# Patient Record
Sex: MALE | Race: OTHER | NOT HISPANIC OR LATINO | Employment: FULL TIME | URBAN - METROPOLITAN AREA
[De-identification: names, ages, dates, MRNs, and addresses within clinical notes are randomized per-mention and may not be internally consistent; named-entity substitution may affect disease eponyms.]

---

## 2023-07-11 ENCOUNTER — HOSPITAL ENCOUNTER (EMERGENCY)
Facility: HOSPITAL | Age: 38
Discharge: HOME | End: 2023-07-12
Attending: FAMILY MEDICINE

## 2023-07-11 DIAGNOSIS — K91.840 HEMORRHAGE OF TOOTH SOCKET: Primary | ICD-10-CM

## 2023-07-12 VITALS
HEART RATE: 79 BPM | HEIGHT: 63 IN | WEIGHT: 169.75 LBS | RESPIRATION RATE: 20 BRPM | DIASTOLIC BLOOD PRESSURE: 88 MMHG | TEMPERATURE: 98 F | OXYGEN SATURATION: 100 % | SYSTOLIC BLOOD PRESSURE: 143 MMHG | BODY MASS INDEX: 30.08 KG/M2

## 2023-07-12 PROCEDURE — 99283 EMERGENCY DEPT VISIT LOW MDM: CPT

## 2023-07-12 PROCEDURE — 25000003 PHARM REV CODE 250: Performed by: FAMILY MEDICINE

## 2023-07-12 RX ORDER — TRANEXAMIC ACID 100 MG/ML
1000 INJECTION, SOLUTION INTRAVENOUS
Status: COMPLETED | OUTPATIENT
Start: 2023-07-12 | End: 2023-07-12

## 2023-07-12 RX ORDER — LIDOCAINE HYDROCHLORIDE AND EPINEPHRINE BITARTRATE 20; .01 MG/ML; MG/ML
1.7 INJECTION, SOLUTION SUBCUTANEOUS ONCE
Status: DISCONTINUED | OUTPATIENT
Start: 2023-07-12 | End: 2023-07-12

## 2023-07-12 RX ORDER — LIDOCAINE HYDROCHLORIDE AND EPINEPHRINE BITARTRATE 20; .01 MG/ML; MG/ML
1.7 INJECTION, SOLUTION SUBCUTANEOUS
Status: COMPLETED | OUTPATIENT
Start: 2023-07-12 | End: 2023-07-12

## 2023-07-12 RX ADMIN — TRANEXAMIC ACID 500 MG: 100 INJECTION, SOLUTION INTRAVENOUS at 12:07

## 2023-07-12 RX ADMIN — LIDOCAINE HYDROCHLORIDE AND EPINEPHRINE BITARTRATE 1.7 ML: 20; .01 INJECTION, SOLUTION SUBCUTANEOUS at 02:07

## 2023-07-12 NOTE — ED PROVIDER NOTES
Encounter Date: 7/11/2023       History     Chief Complaint   Patient presents with    mouth bleeding secondary to tooth extraction     Bleeding to oral cavity secondary to tooth extraction earlier today. Vss. juan joseleigh     Gabriel Max is a 38 y.o. Mauritian speaking male who presents to the ED with bleeding from area of dental extraction. Patient oferred a language interpretor but he preferred for his son to interperet via telephone. Patient had an extraction of his right first premolar around noon today. Reports eating a plantain around 6 pm and the bleeding started shortly after. He tried applying pressure to the site without relief. Not on blood thinners. Denies taking aspirin, ibuprofen, or other NSAIDs today. Did take tylenol PTA for the pain. Denies dizziness, lightheadedness, SOB.    The history is provided by the patient. The history is limited by a language barrier. A  was used.   Review of patient's allergies indicates:  No Known Allergies  History reviewed. No pertinent past medical history.  History reviewed. No pertinent surgical history.  History reviewed. No pertinent family history.     Review of Systems   Constitutional:  Negative for activity change, chills and fever.   HENT:  Positive for dental problem. Negative for congestion and trouble swallowing.    Eyes:  Negative for photophobia and visual disturbance.   Respiratory:  Negative for chest tightness, shortness of breath and wheezing.    Cardiovascular:  Negative for chest pain, palpitations and leg swelling.   Gastrointestinal:  Negative for abdominal pain, constipation, diarrhea, nausea and vomiting.   Genitourinary:  Negative for dysuria, frequency, hematuria and urgency.   Musculoskeletal:  Negative for arthralgias, back pain and gait problem.   Skin:  Negative for color change and rash.   Neurological:  Negative for dizziness, syncope, weakness, light-headedness, numbness and headaches.   Psychiatric/Behavioral:   Negative for agitation and confusion. The patient is not nervous/anxious.      Physical Exam     Initial Vitals [07/11/23 9]   BP Pulse Resp Temp SpO2   -- 78 18 97.3 °F (36.3 °C) 98 %      MAP       --         Physical Exam    Nursing note and vitals reviewed.  Constitutional: He appears well-developed and well-nourished. No distress.   HENT:   Head: Normocephalic and atraumatic.   Mouth/Throat: No oropharyngeal exudate.       Eyes: EOM are normal. No scleral icterus.   Neck: Neck supple.   Normal range of motion.  Cardiovascular:  Normal rate and regular rhythm.           No murmur heard.  Pulmonary/Chest: No respiratory distress. He has no wheezes.   Abdominal: Abdomen is soft. He exhibits no distension. There is no abdominal tenderness.   Musculoskeletal:         General: No edema. Normal range of motion.      Cervical back: Normal range of motion and neck supple.     Neurological: He is alert and oriented to person, place, and time. No cranial nerve deficit.   Skin: Skin is warm and dry. Capillary refill takes less than 2 seconds. No erythema.   Psychiatric: He has a normal mood and affect. Thought content normal.       ED Course   Procedures  Labs Reviewed - No data to display       Imaging Results    None          Medications   tranexamic acid injection Soln 1,000 mg (500 mg Nasal Given 7/12/23 0056)   LIDOcaine-EPINEPHrine dental injection 1.7 mL (1.7 mLs Dental Given by Provider 7/12/23 0219)                 ED Course as of 07/12/23 0311 Wed Jul 12, 2023   0054 Second attempt with Dark Tea bag did not result in coagulation.  Will proceed with Tranexamic acid applied topically. [MW]   0156 No improvement with TXA - still having oozing to the area.  Will inject with lidocaine 1% with epi, and try additional application of TXA. [MW]   0218 Lidocaine 1% with epi, 1.5mL used to inject around gumline of extracted tooth - bleeding diminished significantly.  Additional 500mL of Tranexamic acid soaked on 4x4  and placed in gum area - will continue to monitor. [MW]   0308 Bleeding has significantly improved.  Only slight oozing.  Discussed with patient and informed to gargle with cold water, and also may use dark tea bags to bite down on to helped to stop the bleeding if restarts (tannic acid). [MW]      ED Course User Index  [MW] Aditya Camarillo MD                 Clinical Impression:   Final diagnoses:  [K91.840] Hemorrhage of tooth socket (Primary)        ED Disposition Condition    Discharge Stable          ED Prescriptions    None       Follow-up Information       Follow up With Specialties Details Why Contact Info    [facility] Emergency Medicine  As needed, If symptoms worsen [address] [facility] [address] [phone]    Primary Care Physician  In 5 days               Aditya Camarillo MD  07/12/23 5618

## 2024-12-21 ENCOUNTER — HOSPITAL ENCOUNTER (EMERGENCY)
Facility: HOSPITAL | Age: 39
Discharge: HOME | End: 2024-12-21
Attending: EMERGENCY MEDICINE

## 2024-12-21 VITALS
SYSTOLIC BLOOD PRESSURE: 152 MMHG | WEIGHT: 180 LBS | TEMPERATURE: 98 F | OXYGEN SATURATION: 98 % | RESPIRATION RATE: 20 BRPM | DIASTOLIC BLOOD PRESSURE: 111 MMHG | HEIGHT: 60 IN | HEART RATE: 82 BPM | BODY MASS INDEX: 35.34 KG/M2

## 2024-12-21 DIAGNOSIS — I10 PRIMARY HYPERTENSION: ICD-10-CM

## 2024-12-21 DIAGNOSIS — Z20.2 POSSIBLE EXPOSURE TO STD: Primary | ICD-10-CM

## 2024-12-21 LAB
ABS NEUT CALC (OHS): 5.36 X10(3)/MCL (ref 2.1–9.2)
ALBUMIN SERPL-MCNC: 4.2 G/DL (ref 3.5–5)
ALBUMIN/GLOB SERPL: 1.2 RATIO (ref 1.1–2)
ALP SERPL-CCNC: 103 UNIT/L (ref 40–150)
ALT SERPL-CCNC: 104 UNIT/L (ref 0–55)
ANION GAP SERPL CALC-SCNC: 10 MEQ/L
AST SERPL-CCNC: 53 UNIT/L (ref 5–34)
BILIRUB SERPL-MCNC: 0.7 MG/DL
BILIRUB UR QL STRIP.AUTO: NEGATIVE
BUN SERPL-MCNC: 13.6 MG/DL (ref 8.9–20.6)
CALCIUM SERPL-MCNC: 9.6 MG/DL (ref 8.4–10.2)
CHLORIDE SERPL-SCNC: 103 MMOL/L (ref 98–107)
CLARITY UR: CLEAR
CO2 SERPL-SCNC: 28 MMOL/L (ref 22–29)
COLOR UR AUTO: NORMAL
CREAT SERPL-MCNC: 0.93 MG/DL (ref 0.72–1.25)
CREAT/UREA NIT SERPL: 15
ERYTHROCYTE [DISTWIDTH] IN BLOOD BY AUTOMATED COUNT: 12 % (ref 11.5–17)
GFR SERPLBLD CREATININE-BSD FMLA CKD-EPI: >60 ML/MIN/1.73/M2
GLOBULIN SER-MCNC: 3.4 GM/DL (ref 2.4–3.5)
GLUCOSE SERPL-MCNC: 135 MG/DL (ref 74–100)
GLUCOSE UR QL STRIP: NEGATIVE
HCT VFR BLD AUTO: 49.3 % (ref 42–52)
HGB BLD-MCNC: 16.9 G/DL (ref 14–18)
HGB UR QL STRIP: NEGATIVE
KETONES UR QL STRIP: NEGATIVE
LEUKOCYTE ESTERASE UR QL STRIP: NEGATIVE
LYMPH ABN # BLD MANUAL: 9 %
LYMPHOCYTES NFR BLD MANUAL: 40 % (ref 13–40)
LYMPHOCYTES NFR BLD MANUAL: 5.1 X10(3)/MCL
MCH RBC QN AUTO: 30.8 PG (ref 27–31)
MCHC RBC AUTO-ENTMCNC: 34.3 G/DL (ref 33–36)
MCV RBC AUTO: 90 FL (ref 80–94)
MONOCYTES NFR BLD MANUAL: 1.15 X10(3)/MCL (ref 0.1–1.3)
MONOCYTES NFR BLD MANUAL: 9 % (ref 2–11)
NEUTROPHILS NFR BLD MANUAL: 42 % (ref 47–80)
NITRITE UR QL STRIP: NEGATIVE
NRBC BLD AUTO-RTO: 0 %
PH UR STRIP: 7 [PH]
PLATELET # BLD AUTO: 367 X10(3)/MCL (ref 130–400)
PLATELET # BLD EST: ABNORMAL 10*3/UL
PMV BLD AUTO: 8.4 FL (ref 7.4–10.4)
POTASSIUM SERPL-SCNC: 3.8 MMOL/L (ref 3.5–5.1)
PROT SERPL-MCNC: 7.6 GM/DL (ref 6.4–8.3)
PROT UR QL STRIP: NEGATIVE
RBC # BLD AUTO: 5.48 X10(6)/MCL (ref 4.7–6.1)
RBC MORPH BLD: NORMAL
SMUDGE CELL (OLG): SLIGHT
SODIUM SERPL-SCNC: 141 MMOL/L (ref 136–145)
SP GR UR STRIP.AUTO: <=1.005 (ref 1–1.03)
UROBILINOGEN UR STRIP-ACNC: 0.2
WBC # BLD AUTO: 12.76 X10(3)/MCL (ref 4.5–11.5)

## 2024-12-21 PROCEDURE — 80053 COMPREHEN METABOLIC PANEL: CPT | Performed by: EMERGENCY MEDICINE

## 2024-12-21 PROCEDURE — 85025 COMPLETE CBC W/AUTO DIFF WBC: CPT | Performed by: EMERGENCY MEDICINE

## 2024-12-21 PROCEDURE — 99283 EMERGENCY DEPT VISIT LOW MDM: CPT

## 2024-12-21 PROCEDURE — 81003 URINALYSIS AUTO W/O SCOPE: CPT | Performed by: PHYSICIAN ASSISTANT

## 2024-12-21 PROCEDURE — 87491 CHLMYD TRACH DNA AMP PROBE: CPT | Performed by: PHYSICIAN ASSISTANT

## 2024-12-21 RX ORDER — LOSARTAN POTASSIUM 50 MG/1
50 TABLET ORAL DAILY
Qty: 90 TABLET | Refills: 1 | Status: SHIPPED | OUTPATIENT
Start: 2024-12-21 | End: 2025-12-21

## 2024-12-22 LAB
C TRACH DNA SPEC QL NAA+PROBE: NOT DETECTED
N GONORRHOEA DNA SPEC QL NAA+PROBE: NOT DETECTED
SOURCE (OHS): NORMAL

## 2024-12-22 NOTE — ED PROVIDER NOTES
"Encounter Date: 12/21/2024       History     Chief Complaint   Patient presents with    Penile Discharge     Pt states he had penile bleeding after intercourse 3 days ago.  No bleeding since, but does have burning and "bladder pain"     See [facility] for details.      The history is provided by the patient. The history is limited by a language barrier. A  was used.     Review of patient's allergies indicates:  No Known Allergies  History reviewed. No pertinent past medical history.  History reviewed. No pertinent surgical history.  No family history on file.  Social History     Tobacco Use    Smoking status: Some Days     Current packs/day: 0.50     Types: Cigarettes    Smokeless tobacco: Never   Substance Use Topics    Alcohol use: Not Currently     Review of Systems   Constitutional: Negative.  Negative for activity change, appetite change, diaphoresis, fatigue and fever.   HENT:  Negative for rhinorrhea and sinus pressure.    Eyes: Negative.    Respiratory: Negative.  Negative for chest tightness.    Cardiovascular:  Negative for chest pain.   Gastrointestinal: Negative.  Negative for abdominal distention and abdominal pain.   Endocrine: Negative.    Genitourinary:  Positive for penile discharge.   Musculoskeletal: Negative.  Negative for arthralgias.   Allergic/Immunologic: Negative.    Neurological:  Negative for dizziness and headaches.   Hematological: Negative.    Psychiatric/Behavioral: Negative.         Physical Exam     Initial Vitals [12/21/24 1932]   BP Pulse Resp Temp SpO2   (!) 173/120 84 20 99 °F (37.2 °C) 98 %      MAP       --         Physical Exam    Nursing note and vitals reviewed.  Constitutional: He appears well-developed and well-nourished. He is cooperative. No distress.   HENT:   Head: Normocephalic and atraumatic. Not macrocephalic.   Right Ear: Tympanic membrane normal. Tympanic membrane is not erythematous.   Left Ear: Tympanic membrane normal. Tympanic membrane is not " erythematous.   Nose: No mucosal edema. Right sinus exhibits no frontal sinus tenderness. Left sinus exhibits no frontal sinus tenderness. Mouth/Throat: Mucous membranes are normal. No oropharyngeal exudate.   Eyes: Conjunctivae and EOM are normal. Pupils are equal, round, and reactive to light. Right eye exhibits no discharge. Left eye exhibits no discharge.   Neck: Neck supple. No tracheal deviation present.   Normal range of motion.  Cardiovascular:  Normal rate and regular rhythm.           Pulmonary/Chest: Effort normal. No respiratory distress. He has no decreased breath sounds. He has no wheezes.   Musculoskeletal:         General: Normal range of motion.      Cervical back: Normal range of motion and neck supple.     Lymphadenopathy:        Head (right side): No submental adenopathy present.        Head (left side): No submental adenopathy present.     He has no cervical adenopathy.   Neurological: He is alert and oriented to person, place, and time. He has normal strength. No cranial nerve deficit. GCS score is 15. GCS eye subscore is 4. GCS verbal subscore is 5. GCS motor subscore is 6.   Skin: Skin is warm.   Psychiatric: He has a normal mood and affect. His behavior is normal. Judgment and thought content normal.         ED Course   Procedures  Labs Reviewed   COMPREHENSIVE METABOLIC PANEL - Abnormal       Result Value    Sodium 141      Potassium 3.8      Chloride 103      CO2 28      Glucose 135 (*)     Blood Urea Nitrogen 13.6      Creatinine 0.93      Calcium 9.6      Protein Total 7.6      Albumin 4.2      Globulin 3.4      Albumin/Globulin Ratio 1.2      Bilirubin Total 0.7             (*)     AST 53 (*)     eGFR >60      Anion Gap 10.0      BUN/Creatinine Ratio 15     CBC WITH DIFFERENTIAL - Abnormal    WBC 12.76 (*)     RBC 5.48      Hgb 16.9      Hct 49.3      MCV 90.0      MCH 30.8      MCHC 34.3      RDW 12.0      Platelet 367      MPV 8.4      NRBC% 0.0     MANUAL DIFFERENTIAL -  Abnormal    Neutrophils % 42 (*)     Lymphs % 40      Monocytes % 9      Abnormal Lymphs % 9      Neutrophils Abs Calc 5.3592      Lymphs Abs 5.104 (*)     Monocytes Abs 1.1484      Platelets Increased (*)     RBC Morph Normal      Smudge Cells Slight (*)    URINALYSIS, REFLEX TO URINE CULTURE - Normal    Color, UA Light-Yellow      Appearance, UA Clear      Specific Gravity, UA <=1.005      pH, UA 7.0      Protein, UA Negative      Glucose, UA Negative      Ketones, UA Negative      Blood, UA Negative      Bilirubin, UA Negative      Urobilinogen, UA 0.2      Nitrites, UA Negative      Leukocyte Esterase, UA Negative     CHLAMYDIA/GONORRHOEAE(GC), PCR   CBC W/ AUTO DIFFERENTIAL    Narrative:     The following orders were created for panel order CBC auto differential.  Procedure                               Abnormality         Status                     ---------                               -----------         ------                     CBC with Differential[[id number]]        Abnormal            Final result               Manual Differential[[id number]]          Abnormal            Final result                 Please view results for these tests on the individual orders.          Imaging Results    None          Medications - No data to display  Medical Decision Making  39yoHM w/no PMH presents to the ER with blood from penis after intercourse 2 days ago.  No more bleeding since then.  No penile discharge.  No abdominal pain or back pain.  No concern for STD.  No dysuria.  [place] video interpretor used.     Amount and/or Complexity of Data Reviewed  Labs: ordered. Decision-making details documented in ED Course.    Risk  Prescription drug management.               ED Course as of 12/21/24 2234   Sat Dec 21, 2024   2038 WBC(!): 12.76 [SH]   2038 Hemoglobin: 16.9 [SH]   2038 Hematocrit: 49.3 [SH]   2038 Platelet Count: 367 [SH]   2038 Sodium: 141 [SH]   2038 Potassium: 3.8 [SH]   2038 Chloride: 103 [SH]   2038 CO2:  28 [SH]   2038 Glucose(!): 135 [SH]   2038 BUN: 13.6 [SH]   2038 Creatinine: 0.93 [SH]   2038 Leukocyte Esterase, UA: Negative [SH]   2038 NITRITE UA: Negative [SH]   2038 Urobilinogen, UA: 0.2 [SH]   2038 Ketones, UA: Negative [SH]   2046 Peruvian video  used to discuss test results.  Patient states he is not concerned that he has an STD.  Small amount of urethral blood after intercourse is not uncommon but I did recommend that if it happens again, he will need to see a specialist for further testing.  He does not want take prophylaxis against gonorrhea and chlamydia as he states he does not believe he has that.  Her blood pressure was discussed.  Recommend that he take blood pressure medicine patient every so team for further care.  This was discussed at length using the [place]uvian video .  ER return precautions were discussed as well. [SH]      ED Course User Index  [SH] Alondra Tejada MD                           Clinical Impression:  Final diagnoses:  [Z20.2] Possible exposure to STD (Primary)  [I10] Primary hypertension          ED Disposition Condition    Discharge Stable          ED Prescriptions       Medication Sig Dispense Start Date End Date Auth. Provider    losartan (COZAAR) 50 MG tablet Take 1 tablet (50 mg total) by mouth once daily. 90 tablet 12/21/2024 12/21/2025 Alondra Tejada MD          Follow-up Information       Follow up With Specialties Details Why Contact Info    PCP   Follow up requested at [facility]              Alondra Tejada MD  12/21/24 7034

## 2025-01-24 ENCOUNTER — HOSPITAL ENCOUNTER (EMERGENCY)
Facility: HOSPITAL | Age: 40
Discharge: HOME | End: 2025-01-24

## 2025-01-24 VITALS
WEIGHT: 180 LBS | DIASTOLIC BLOOD PRESSURE: 96 MMHG | BODY MASS INDEX: 35.34 KG/M2 | SYSTOLIC BLOOD PRESSURE: 157 MMHG | OXYGEN SATURATION: 99 % | RESPIRATION RATE: 20 BRPM | HEART RATE: 104 BPM | HEIGHT: 60 IN | TEMPERATURE: 102 F

## 2025-01-24 DIAGNOSIS — B34.9 VIRAL ILLNESS: Primary | ICD-10-CM

## 2025-01-24 DIAGNOSIS — J11.1 INFLUENZA: ICD-10-CM

## 2025-01-24 LAB
BILIRUB UR QL STRIP.AUTO: NEGATIVE
CLARITY UR: CLEAR
COLOR UR AUTO: YELLOW
FLUAV AG UPPER RESP QL IA.RAPID: DETECTED
FLUBV AG UPPER RESP QL IA.RAPID: NOT DETECTED
GLUCOSE UR QL STRIP: NEGATIVE
HGB UR QL STRIP: NEGATIVE
KETONES UR QL STRIP: NEGATIVE
LEUKOCYTE ESTERASE UR QL STRIP: NEGATIVE
NITRITE UR QL STRIP: NEGATIVE
PH UR STRIP: 7 [PH]
PROT UR QL STRIP: NEGATIVE
RSV A 5' UTR RNA NPH QL NAA+PROBE: NOT DETECTED
SARS-COV-2 RNA RESP QL NAA+PROBE: NOT DETECTED
SP GR UR STRIP.AUTO: 1.01 (ref 1–1.03)
STREP A PCR (OHS): NOT DETECTED
UROBILINOGEN UR STRIP-ACNC: 1

## 2025-01-24 PROCEDURE — 87651 STREP A DNA AMP PROBE: CPT | Performed by: PHYSICIAN ASSISTANT

## 2025-01-24 PROCEDURE — 81003 URINALYSIS AUTO W/O SCOPE: CPT | Performed by: PHYSICIAN ASSISTANT

## 2025-01-24 PROCEDURE — 99284 EMERGENCY DEPT VISIT MOD MDM: CPT

## 2025-01-24 PROCEDURE — 0241U COVID/RSV/FLU A&B PCR: CPT | Performed by: PHYSICIAN ASSISTANT

## 2025-01-24 RX ORDER — ONDANSETRON 4 MG/1
4 TABLET, FILM COATED ORAL EVERY 6 HOURS
Qty: 12 TABLET | Refills: 0 | Status: SHIPPED | OUTPATIENT
Start: 2025-01-24

## 2025-01-24 RX ORDER — OSELTAMIVIR PHOSPHATE 75 MG/1
75 CAPSULE ORAL 2 TIMES DAILY
Qty: 10 CAPSULE | Refills: 0 | Status: SHIPPED | OUTPATIENT
Start: 2025-01-24 | End: 2025-01-29

## 2025-01-25 NOTE — ED PROVIDER NOTES
Encounter Date: 1/24/2025       History     Chief Complaint   Patient presents with    Fever     Fever and vomiting x onset x 2 days. Pt took tylenol at approx 1400 today. Denies diarrhea. Pt states chills onset yesterday.      Patient presents with:  Fever: Fever and vomiting x onset x 2 days. Pt took tylenol at approx 1400 today. Denies diarrhea. Pt states chills onset yesterday.           Fever  Primary symptoms of the febrile illness include fever, cough, nausea and vomiting. Primary symptoms do not include wheezing, shortness of breath, dysuria or rash. This is a new problem.   The maximum temperature recorded prior to his arrival was 102 to 102.9 F.   The cough began yesterday.   Nausea began yesterday.   The vomiting began today.     Review of patient's allergies indicates:  No Known Allergies  History reviewed. No pertinent past medical history.  History reviewed. No pertinent surgical history.  No family history on file.  Social History     Tobacco Use    Smoking status: Some Days     Current packs/day: 0.50     Types: Cigarettes    Smokeless tobacco: Never   Substance Use Topics    Alcohol use: Not Currently     Review of Systems   Constitutional:  Positive for fever.   HENT:  Negative for sore throat.    Respiratory:  Positive for cough. Negative for shortness of breath and wheezing.    Cardiovascular:  Negative for chest pain.   Gastrointestinal:  Positive for nausea and vomiting.   Genitourinary:  Negative for dysuria.   Musculoskeletal:  Negative for back pain.   Skin:  Negative for rash.   Neurological:  Negative for weakness.   Hematological:  Does not bruise/bleed easily.       Physical Exam     Initial Vitals [01/24/25 1624]   BP Pulse Resp Temp SpO2   (!) 157/96 104 20 (!) 102.3 °F (39.1 °C) 99 %      MAP       --         Physical Exam    Vitals reviewed.  Constitutional: He appears well-developed and well-nourished.   HENT:   Right Ear: Tympanic membrane is not retracted and not bulging.   Left  Ear: Tympanic membrane is not retracted and not bulging.   Nose: Nose normal. Right sinus exhibits no frontal sinus tenderness. Left sinus exhibits no frontal sinus tenderness. Mouth/Throat: Oropharynx is clear and moist and mucous membranes are normal.   Cardiovascular:  Normal rate.           Pulmonary/Chest: Breath sounds normal.   Abdominal: Abdomen is soft.     Neurological: He is alert and oriented to person, place, and time. He has normal strength. GCS eye subscore is 4. GCS verbal subscore is 5. GCS motor subscore is 6.   Skin: Skin is warm.   Psychiatric: He has a normal mood and affect. Thought content normal.         ED Course   Procedures  Labs Reviewed   COVID/RSV/FLU A&B PCR - Abnormal       Result Value    Influenza A PCR Detected (*)     Influenza B PCR Not Detected      Respiratory Syncytial Virus PCR Not Detected      SARS-CoV-2 PCR Not Detected      Narrative:     The Xpert Xpress SARS-CoV-2/FLU/RSV plus is a rapid, multiplexed real-time PCR test intended for the simultaneous qualitative detection and differentiation of SARS-CoV-2, Influenza A, Influenza B, and respiratory syncytial virus (RSV) viral RNA in either nasopharyngeal swab or nasal swab specimens.         STREP GROUP A BY PCR - Normal    STREP A PCR ([facility]) Not Detected      Narrative:     The Xpert Xpress Strep A test is a rapid, qualitative in vitro diagnostic test for the detection of Streptococcus pyogenes (Group A ß-hemolytic Streptococcus, Strep A) in throat swab specimens from patients with signs and symptoms of pharyngitis.     URINALYSIS, REFLEX TO URINE CULTURE - Normal    Color, UA Yellow      Appearance, UA Clear      Specific Gravity, UA 1.015      pH, UA 7.0      Protein, UA Negative      Glucose, UA Negative      Ketones, UA Negative      Blood, UA Negative      Bilirubin, UA Negative      Urobilinogen, UA 1.0      Nitrites, UA Negative      Leukocyte Esterase, UA Negative            Imaging Results    None           Medications - No data to display  Medical Decision Making  This is a 39-year-old male who presents to the ED with symptoms of nausea and vomiting for the last 2 days.  Wife states that they were sick at home with the flu.  He started having sore throat and the wife gave him amoxicillin and he does not feel that is helping.  Eating tolerating solids and fluids well.  Patient is taking ibuprofen and Tylenol alternating as needed    Amount and/or Complexity of Data Reviewed  Discussion of management or test interpretation with external provider(s): I discussed with patient to discontinue amoxicillin.  this is  a viral illness.  Advised patient to continue with tylenol ibuprofen alternating as needed for fever.  Prescribing Tamiflu    Risk  Prescription drug management.    Judging by the patient's chief complaint and pertinent history, the patient has the following possible differential diagnoses, including but not limited to the following.  Some of these are deemed to be lower likelihood and some more likely based on my physical exam and history combined with possible lab work and/or imaging studies.   Please see the pertinent studies, and refer to the HPI.  Some of these diagnoses will take further evaluation to fully rule out, perhaps as an outpatient and the patient was encouraged to follow up when discharged for more comprehensive evaluation.    pneumonia, bronchitis, viral syndrome, covid, flu, croup,                The patient is resting comfortably in no acute distress.  He is hemodynamically stable and is without objective evidence for acute process requiring urgent intervention or hospitalization. I provided counseling to patient with regard to condition, the treatment plan, specific conditions for return, and the importance of follow up. Detailed written and verbal instructions provided to patient and he expressed a verbal understanding of the discharge instructions and overall management plan. Reiterated  the importance of medication administration and safety and advised patient to follow up with primary care provider in 3-5 days or sooner if needed.  Answered questions at this time. The patient is stable for discharge.                    Clinical Impression:  Final diagnoses:  [B34.9] Viral illness (Primary)  [J11.1] Influenza                 Jill Contreras PA  01/24/25 1858